# Patient Record
Sex: MALE | Race: BLACK OR AFRICAN AMERICAN | NOT HISPANIC OR LATINO | Employment: FULL TIME | URBAN - METROPOLITAN AREA
[De-identification: names, ages, dates, MRNs, and addresses within clinical notes are randomized per-mention and may not be internally consistent; named-entity substitution may affect disease eponyms.]

---

## 2018-04-15 ENCOUNTER — HOSPITAL ENCOUNTER (EMERGENCY)
Facility: HOSPITAL | Age: 28
Discharge: HOME | End: 2018-04-16
Attending: EMERGENCY MEDICINE | Admitting: EMERGENCY MEDICINE
Payer: COMMERCIAL

## 2018-04-15 DIAGNOSIS — J36 PERITONSILLAR ABSCESS: Primary | ICD-10-CM

## 2018-04-15 LAB
ANION GAP SERPL CALC-SCNC: 11 MMOL/L
BASOPHILS # BLD AUTO: 0.02 K/UL
BASOPHILS NFR BLD: 0.1 %
BUN SERPL-MCNC: 14 MG/DL
CALCIUM SERPL-MCNC: 9.3 MG/DL
CHLORIDE SERPL-SCNC: 104 MMOL/L
CO2 SERPL-SCNC: 27 MMOL/L
CREAT SERPL-MCNC: 1 MG/DL
DEPRECATED S PYO AG THROAT QL EIA: NEGATIVE
DIFFERENTIAL METHOD: ABNORMAL
EOSINOPHIL # BLD AUTO: 0.1 K/UL
EOSINOPHIL NFR BLD: 0.4 %
ERYTHROCYTE [DISTWIDTH] IN BLOOD BY AUTOMATED COUNT: 13.8 %
EST. GFR  (AFRICAN AMERICAN): >60 ML/MIN/1.73 M^2
EST. GFR  (NON AFRICAN AMERICAN): >60 ML/MIN/1.73 M^2
GLUCOSE SERPL-MCNC: 95 MG/DL
HCT VFR BLD AUTO: 41.9 %
HGB BLD-MCNC: 14.2 G/DL
LYMPHOCYTES # BLD AUTO: 1.7 K/UL
LYMPHOCYTES NFR BLD: 11.3 %
MCH RBC QN AUTO: 30.5 PG
MCHC RBC AUTO-ENTMCNC: 33.9 G/DL
MCV RBC AUTO: 90 FL
MONOCYTES # BLD AUTO: 1.1 K/UL
MONOCYTES NFR BLD: 7.1 %
NEUTROPHILS # BLD AUTO: 12.2 K/UL
NEUTROPHILS NFR BLD: 80.9 %
PLATELET # BLD AUTO: 192 K/UL
PMV BLD AUTO: 10.7 FL
POTASSIUM SERPL-SCNC: 3.5 MMOL/L
RBC # BLD AUTO: 4.65 M/UL
SODIUM SERPL-SCNC: 142 MMOL/L
WBC # BLD AUTO: 15.11 K/UL

## 2018-04-15 PROCEDURE — 25000003 PHARM REV CODE 250: Performed by: EMERGENCY MEDICINE

## 2018-04-15 PROCEDURE — 85025 COMPLETE CBC W/AUTO DIFF WBC: CPT

## 2018-04-15 PROCEDURE — 80048 BASIC METABOLIC PNL TOTAL CA: CPT

## 2018-04-15 PROCEDURE — 87081 CULTURE SCREEN ONLY: CPT

## 2018-04-15 PROCEDURE — 87880 STREP A ASSAY W/OPTIC: CPT

## 2018-04-15 PROCEDURE — 87147 CULTURE TYPE IMMUNOLOGIC: CPT

## 2018-04-15 PROCEDURE — 63600175 PHARM REV CODE 636 W HCPCS: Performed by: EMERGENCY MEDICINE

## 2018-04-15 RX ORDER — KETOROLAC TROMETHAMINE 30 MG/ML
15 INJECTION, SOLUTION INTRAMUSCULAR; INTRAVENOUS
Status: COMPLETED | OUTPATIENT
Start: 2018-04-15 | End: 2018-04-15

## 2018-04-15 RX ORDER — PENICILLIN V POTASSIUM 500 MG/1
500 TABLET, FILM COATED ORAL
COMMUNITY

## 2018-04-15 RX ORDER — CETIRIZINE HYDROCHLORIDE 10 MG/1
10 TABLET ORAL DAILY
COMMUNITY

## 2018-04-15 RX ORDER — PREDNISONE 20 MG/1
40 TABLET ORAL DAILY
COMMUNITY

## 2018-04-15 RX ORDER — LIDOCAINE HYDROCHLORIDE 20 MG/ML
SOLUTION OROPHARYNGEAL
Qty: 3 ML | Refills: 0 | Status: SHIPPED | OUTPATIENT
Start: 2018-04-15

## 2018-04-15 RX ORDER — LIDOCAINE HYDROCHLORIDE 20 MG/ML
5 SOLUTION OROPHARYNGEAL ONCE
Status: COMPLETED | OUTPATIENT
Start: 2018-04-15 | End: 2018-04-15

## 2018-04-15 RX ORDER — KETOROLAC TROMETHAMINE 10 MG/1
10 TABLET, FILM COATED ORAL EVERY 6 HOURS
Qty: 18 TABLET | Refills: 0 | Status: SHIPPED | OUTPATIENT
Start: 2018-04-15

## 2018-04-15 RX ORDER — DIPHENHYDRAMINE HYDROCHLORIDE 50 MG/ML
50 INJECTION INTRAMUSCULAR; INTRAVENOUS
Status: COMPLETED | OUTPATIENT
Start: 2018-04-15 | End: 2018-04-15

## 2018-04-15 RX ADMIN — KETOROLAC TROMETHAMINE 15 MG: 30 INJECTION, SOLUTION INTRAMUSCULAR at 09:04

## 2018-04-15 RX ADMIN — LIDOCAINE HYDROCHLORIDE 5 ML: 20 SOLUTION ORAL; TOPICAL at 09:04

## 2018-04-15 RX ADMIN — DIPHENHYDRAMINE HYDROCHLORIDE 50 MG: 50 INJECTION, SOLUTION INTRAMUSCULAR; INTRAVENOUS at 09:04

## 2018-04-16 VITALS
HEART RATE: 64 BPM | RESPIRATION RATE: 18 BRPM | OXYGEN SATURATION: 96 % | TEMPERATURE: 98 F | WEIGHT: 190 LBS | BODY MASS INDEX: 25.18 KG/M2 | HEIGHT: 73 IN | DIASTOLIC BLOOD PRESSURE: 58 MMHG | SYSTOLIC BLOOD PRESSURE: 130 MMHG

## 2018-04-16 PROCEDURE — 99284 EMERGENCY DEPT VISIT MOD MDM: CPT | Mod: ,,, | Performed by: EMERGENCY MEDICINE

## 2018-04-16 PROCEDURE — 25000003 PHARM REV CODE 250: Performed by: EMERGENCY MEDICINE

## 2018-04-16 PROCEDURE — 99285 EMERGENCY DEPT VISIT HI MDM: CPT

## 2018-04-16 RX ORDER — ACETAMINOPHEN 325 MG/1
650 TABLET ORAL
Status: COMPLETED | OUTPATIENT
Start: 2018-04-16 | End: 2018-04-16

## 2018-04-16 RX ORDER — CLINDAMYCIN HYDROCHLORIDE 150 MG/1
300 CAPSULE ORAL 3 TIMES DAILY
Qty: 60 CAPSULE | Refills: 0 | Status: SHIPPED | OUTPATIENT
Start: 2018-04-16 | End: 2018-04-26

## 2018-04-16 RX ORDER — METHYLPREDNISOLONE 4 MG/1
TABLET ORAL
Qty: 1 PACKAGE | Refills: 0 | Status: SHIPPED | OUTPATIENT
Start: 2018-04-16 | End: 2018-05-07

## 2018-04-16 RX ADMIN — ACETAMINOPHEN 650 MG: 325 TABLET ORAL at 06:04

## 2018-04-16 NOTE — ED NOTES
"Pt states "it is getting harder for me to swallow. My throat hurts worse in the last hour." Denies airway complications/obstruction/changes in breathing. Will continue to monitor.  "

## 2018-04-16 NOTE — ED PROVIDER NOTES
Encounter Date: 4/15/2018    SCRIBE #1 NOTE: I, Priscilla Alonso, am scribing for, and in the presence of, Dr. Jones.       History     Chief Complaint   Patient presents with    Sore Throat     Pt states he has had a sore throat since friday, and is concerned he has an abscess, and needs a scan.      Time seen by provider: 8:30 PM    This is a 27 y.o. male who presents with complaint of sore throat that began two days ago. Patient reports sore throat has gradually worsened since onset. He reports associated throat tightness and painful swallowing. He denies fever, chills, congestion, cough, nausea, or vomiting. He reports being seen at Urgent Care for the same complaint two days ago and was thought to have an allergic reaction. He was prescribed zyrtec and prednisone taper. At that time, patient had a negative rapid strep, and a positive throat culture for strep C.       The history is provided by the patient.     Review of patient's allergies indicates:   Allergen Reactions    Shellfish containing products Anaphylaxis    Bactroban [mupirocin calcium] Rash     History reviewed. No pertinent past medical history.  History reviewed. No pertinent surgical history.  History reviewed. No pertinent family history.  Social History   Substance Use Topics    Smoking status: Never Smoker    Smokeless tobacco: Not on file    Alcohol use Yes      Comment: social     Review of Systems   Constitutional: Negative for chills and fever.   HENT: Positive for sore throat. Negative for congestion and ear pain.         Positive for throat tightness. Positive for painful swallowing.    Respiratory: Negative for cough and shortness of breath.    Cardiovascular: Negative for chest pain.   Gastrointestinal: Negative for nausea and vomiting.   Genitourinary: Negative for dysuria.   Musculoskeletal: Negative for back pain.   Skin: Negative for rash.   Neurological: Negative for weakness.   Hematological: Does not bruise/bleed easily.        Physical Exam     Initial Vitals [04/15/18 1947]   BP Pulse Resp Temp SpO2   134/77 82 18 98.4 °F (36.9 °C) 99 %      MAP       96         Physical Exam    Nursing note and vitals reviewed.  Constitutional: He appears well-developed and well-nourished.  Non-toxic appearance. No distress.   HENT:   Head: Normocephalic and atraumatic.   Nose: Nose normal.   Hoarse voice. Tolerating secretions. No uvular edema Bilateral tonsillar edema. No tonsillar exudates.   No sublingual edema   Eyes: Conjunctivae and EOM are normal. Pupils are equal, round, and reactive to light.   Neck: Normal range of motion. Neck supple.   No meningismus.    Cardiovascular: Normal rate and normal heart sounds.   Pulmonary/Chest: Effort normal and breath sounds normal.   Abdominal: Soft. Normal appearance and bowel sounds are normal. There is no tenderness.   Musculoskeletal: Normal range of motion.   Neurological: He is alert and oriented to person, place, and time. He has normal strength. No cranial nerve deficit or sensory deficit.   Skin: Skin is warm and dry.   Psychiatric: He has a normal mood and affect. His behavior is normal. Thought content normal.         ED Course   Procedures  Labs Reviewed   CBC W/ AUTO DIFFERENTIAL - Abnormal; Notable for the following:        Result Value    WBC 15.11 (*)     Gran # (ANC) 12.2 (*)     Mono # 1.1 (*)     Gran% 80.9 (*)     Lymph% 11.3 (*)     All other components within normal limits   THROAT SCREEN, RAPID   CULTURE, STREP A,  THROAT   BASIC METABOLIC PANEL     Imaging Results          CT Soft Tissue Neck WO Contrast (Final result)  Result time 04/15/18 22:21:58    Final result by Christin Durand MD (04/15/18 22:21:58)                 Impression:      Asymmetric left tonsillar swelling with mild fluid or edema extending throughout the left parapharyngeal region and extending posteriorly to involve the upper retropharyngeal region on the left.  Findings may be seen in setting of  tonsillitis.  Evaluation of potential peritonsillar abscess is limited without the use of IV contrast.      Electronically signed by: Christin Durand MD  Date:    04/15/2018  Time:    22:21             Narrative:    EXAMINATION:  CT SOFT TISSUE NECK WITHOUT CONTRAST    CLINICAL HISTORY:  Sore throat/stridor, epiglottis or tonsillitis suspected;    TECHNIQUE:  Low dose axial images, sagittal and coronal reformations were performed from the skull base to the level of the clavicles.  Contrast was not administered.    COMPARISON:  None    FINDINGS:  There is asymmetric swelling seen within the left tonsillar region.  There is mild surrounding hypodensity suggestive for mild fluid, edema, or phlegmon which extends throughout the left parapharyngeal region and extending posteriorly to the upper retropharyngeal region on the left.  Evaluation for potential peritonsillar abscess is limited without the use of intravenous contrast.  Epiglottis is normal in thickness.  Airway is patent.  Aryepiglottic folds are normal in appearance.    Visualized lung apices are clear.  No acute osseous abnormality identified.  Mucous retention cyst or polyp is noted within the left maxillary antrum.  Remaining visualized paranasal sinuses and mastoid air cells are otherwise clear.  Visualized portion of the brain shows no significant abnormalities.  Parotid glands and submandibular glands are normal and symmetric in size.  Thyroid gland is unremarkable.  Prominent left cervical chain lymph nodes are seen.                                        Medical Decision Making:   Initial Assessment:   Urgent evaluation of 27-year-old gentleman presenting with a sore throat progressing for the last 2 days.  Patient was seen at urgent care, with negative strep screen, however called regarding strep C positive on culture, and discharged with Pen-vk and prednisone.  Patient endorses acute worsening with hoarse voice.  On exam patient is nontoxic-appearing,  no stridor, tolerating secretions, bilateral tonsillar edema present, without obvious exudates.  Suspect pharyngitis though given progression, we will obtain imaging to rule out deep space infection.  Clinical Tests:   Lab Tests: Ordered and Reviewed  Radiological Study: Ordered and Reviewed  ED Management:  Discussed pt's care with a primary provider of his on the phone, who was adamant about pt not receiving IV contrast given fish allergy, and recommending MRI or US for further imaging. Given my clinical exam, pt has patent airway without stridor. Discussed limited utility of CT without contrast, but preferred by pt. Endorsed to Dr Rizzo for follow up and dispo pending results.               Attending Attestation:           Physician Attestation for Scribe:  Physician Attestation Statement for Scribe #1: I, Dr. Jones, reviewed documentation, as scribed by Priscilla Alonso in my presence, and it is both accurate and complete.                    Clinical Impression:     1. Peritonsillar abscess                               Phuong Jones MD  04/17/18 2799

## 2018-04-16 NOTE — ED PROVIDER NOTES
Encounter Date: 4/15/2018    SCRIBE #1 NOTE: I, Vikki Gutiérrez, am scribing for, and in the presence of,  Dr. Meyer. I have scribed the entire note.       History     Chief Complaint   Patient presents with    Sore Throat     Pt states he has had a sore throat since friday, and is concerned he has an abscess, and needs a scan.      27 year old male transferred here for possible pta. He reports pain to left side of his throat and pain with swallowing, but denies any trouble swallowing, fever, and he has no other complaints.        The history is provided by the patient and medical records.     Review of patient's allergies indicates:   Allergen Reactions    Shellfish containing products Anaphylaxis    Bactroban [mupirocin calcium] Rash     History reviewed. No pertinent past medical history.  History reviewed. No pertinent surgical history.  History reviewed. No pertinent family history.  Social History   Substance Use Topics    Smoking status: Never Smoker    Smokeless tobacco: Not on file    Alcohol use Yes      Comment: social     Review of Systems   Constitutional: Negative for fever.   HENT: Negative for sore throat.    Respiratory: Negative for shortness of breath.    Cardiovascular: Negative for chest pain.   Gastrointestinal: Negative for abdominal pain.   Genitourinary: Negative for dysuria.   Musculoskeletal: Negative for back pain.   Skin: Negative for rash.   Neurological: Negative for weakness.   Psychiatric/Behavioral: Negative for confusion.       Physical Exam     Initial Vitals [04/15/18 1947]   BP Pulse Resp Temp SpO2   134/77 82 18 98.4 °F (36.9 °C) 99 %      MAP       96         Physical Exam    Nursing note and vitals reviewed.  Constitutional: He appears well-nourished.   HENT:   Head: Normocephalic and atraumatic.   swollen erythematous left tonsil   Eyes: EOM are normal. Pupils are equal, round, and reactive to light.   Neck: Normal range of motion. Neck supple.    tender anterior  "cervical adenopathy.    Cardiovascular: Normal rate, regular rhythm, normal heart sounds and intact distal pulses.   Pulmonary/Chest: Breath sounds normal. No respiratory distress.   Abdominal: Soft. Bowel sounds are normal. He exhibits no distension. There is no tenderness.   Musculoskeletal: Normal range of motion.   Neurological: He is alert and oriented to person, place, and time. He has normal strength.   Skin: Skin is warm and dry. Capillary refill takes less than 2 seconds.   Psychiatric: He has a normal mood and affect. Thought content normal.         ED Course   Procedures  Labs Reviewed   CBC W/ AUTO DIFFERENTIAL - Abnormal; Notable for the following:        Result Value    WBC 15.11 (*)     Gran # (ANC) 12.2 (*)     Mono # 1.1 (*)     Gran% 80.9 (*)     Lymph% 11.3 (*)     All other components within normal limits   THROAT SCREEN, RAPID   CULTURE, STREP A,  THROAT   BASIC METABOLIC PANEL             Medical Decision Making:   History:   Old Medical Records: I decided to obtain old medical records.  Initial Assessment:   27 year old male transferred here for possible pta. He reports pain to left side of his throat and pain with swallowing, but denies any trouble swallowing, fever, and he has no other complaints.  Differential Diagnosis:   [facility] but refused due to questionable passive agrerrwsive quote "shellfish allergy"  Clinical Tests:   Lab Tests: Ordered and Reviewed  Radiological Study: Ordered and Reviewed  ED Management:  ENT consulted. On arrival at approximately 4 am ENT arrived for evaluation, they offered I&D which patient refused. Will start steroids and clindamycin according to ENT recommendations. Able to tolerate secretions without difficulty. stable for discharge home. Advised pt to follow up with PCP or return if concerning symptoms arise. Pt understands and agrees with plan. Will d/c home.  Other:   I have discussed this case with " another health care provider.       <> Summary of the Discussion: ENT            Scribe Attestation:   Scribe #1: I performed the above scribed service and the documentation accurately describes the services I performed. I attest to the accuracy of the note.               Clinical Impression:   There were no encounter diagnoses.    Disposition:   Disposition: Discharged  Condition: Stable                        Gigi Meyer MD  04/16/18 0539

## 2018-04-16 NOTE — PROGRESS NOTES
11:58 PM  I assumed care of this patient at 10 pm from Dr. Jones.  At that time the patient was awaiting the results of his CT.  CT cannot exclude a peritonsillar abscess given lack of contrast.  I explained this to the patient and his PCP in [place].  I do feel the patient is appropriate for f/u in clinic for further evaluation by ENT, but [facility]  The case has been d/w Dr. Saeed and the patient will be transferred to [facility] for ENT evaluation.

## 2018-04-16 NOTE — ED NOTES
Pt complains of a sore throat since Friday. Was seen at urgent care and given prednisone and zyrtec, rapid strep was negative but today was called and told culture was positive for strep c and was given penicillin which he took 1 dose. He states it hurts to swallow and to talk. Tonsils are large and red throat is red. No exudate noted.

## 2018-04-16 NOTE — ED NOTES
At bedside with Dr Rizzo to discuss plan of care, pt calls PCP to consult over the phone. Pt is explained the options of aspiration by ER physician here, [facility] versus clinic follow up. Awaiting pt decision.

## 2018-04-16 NOTE — ED NOTES
Pt is resting in bed, sitting up watching tv with significant other at bedside. Pt holding own airway with no difficulty. Will continue to monitor.

## 2018-04-16 NOTE — CONSULTS
Otolaryngology - Head and Neck Surgery  Consultation Report    Consultation From: ED    Chief Complaint: Evaluate for PTA    History of Present Illness: 27 y.o. year old male presents sore throat for the past two days that has progressively worsened. He complains of sore throat and pain with swallowing. He denies any respiratory difficulty/noisy breathing, denies shortness of breath, denies trismus/difficulty with mouth opening, denies difficulty tolerating secretions.      Review of Systems   Constitutional: Negative for fever.   HENT: sore throat  Respiratory: Negative for shortness of breath.    Cardiovascular: Negative for chest pain.   Gastrointestinal: Negative for abdominal pain.   Genitourinary: Negative for dysuria.   Musculoskeletal: Negative for back pain.   Skin: Negative for rash.   Neurological: Negative for weakness.   Psychiatric/Behavioral: Negative for confusion.     Past Medical History: Patient has no past medical history on file.    Past Surgical History: Patient has no past surgical history on file.    Social History: Patient reports that he has never smoked. He does not have any smokeless tobacco history on file. He reports that he drinks alcohol. He reports that he does not use drugs.    Family History: family history is not on file.    Medications:   No current facility-administered medications on file prior to encounter.      No current outpatient prescriptions on file prior to encounter.         Allergies: Patient is allergic to shellfish containing products and bactroban [mupirocin calcium].    Physical Exam:  Temp:  [98.4 °F (36.9 °C)] 98.4 °F (36.9 °C)  Pulse:  [60-98] 76  Resp:  [18] 18  SpO2:  [95 %-99 %] 97 %  BP: (131-156)/(60-84) 150/66        Constitutional: Well appearing / communicating.  NAD.  Eyes: EOM I Bilaterally  Head/Face: Normocephalic.  Negative paranasal sinus pressure/tenderness.  Salivary glands WNL.  House Brackmann I Bilaterally.  Right Ear: External Auditory Canal  WNL, Auricle WNL.  Left Ear: External Auditory Canal WNL, Auricle WNL.  Nose: No gross nasal septal deviation. Inferior Turbinates 2+ bilaterally. No septal perforation. No masses/lesions. External nasal skin without masses/lesions.  Oral Cavity: Gingiva/lips WNL.  FOM Soft, no masses palpated. Oral Tongue mobile. Hard Palate WNL.   Oropharynx: left tonsil 3+ with mild edema of soft palate. Midline uvula, no exudate. Right tonsil 2+.BOT WNL. No masses/lesions noted. Posterior oropharynx WNL.  Soft palate without masses.   Neck/Lymphatic: No LAD I-VI bilaterally.  No thyromegaly.  No masses noted on exam.  Neuro/Psychiatric: AOx3.  Normal mood and affect.   Cardiovascular: Normal carotid pulses bilaterally, no increasing jugular venous distention noted at cervical region bilaterally.    Respiratory: Normal respiratory effort, no stridor, no retractions noted.        CBC    Recent Labs  Lab 04/15/18  2053   WBC 15.11*   HGB 14.2   HCT 41.9   MCV 90        BMP    Recent Labs  Lab 04/15/18  2053   GLU 95      K 3.5      CO2 27   BUN 14   CREATININE 1.0   CALCIUM 9.3       Imaging Results          CT Soft Tissue Neck WO Contrast (Final result)  Result time 04/15/18 22:21:58    Final result by Christin Durand MD (04/15/18 22:21:58)                 Impression:      Asymmetric left tonsillar swelling with mild fluid or edema extending throughout the left parapharyngeal region and extending posteriorly to involve the upper retropharyngeal region on the left.  Findings may be seen in setting of tonsillitis.  Evaluation of potential peritonsillar abscess is limited without the use of IV contrast.      Electronically signed by: Christin Durand MD  Date:    04/15/2018  Time:    22:21             Narrative:    EXAMINATION:  CT SOFT TISSUE NECK WITHOUT CONTRAST    CLINICAL HISTORY:  Sore throat/stridor, epiglottis or tonsillitis suspected;    TECHNIQUE:  Low dose axial images, sagittal and coronal reformations  were performed from the skull base to the level of the clavicles.  Contrast was not administered.    COMPARISON:  None    FINDINGS:  There is asymmetric swelling seen within the left tonsillar region.  There is mild surrounding hypodensity suggestive for mild fluid, edema, or phlegmon which extends throughout the left parapharyngeal region and extending posteriorly to the upper retropharyngeal region on the left.  Evaluation for potential peritonsillar abscess is limited without the use of intravenous contrast.  Epiglottis is normal in thickness.  Airway is patent.  Aryepiglottic folds are normal in appearance.    Visualized lung apices are clear.  No acute osseous abnormality identified.  Mucous retention cyst or polyp is noted within the left maxillary antrum.  Remaining visualized paranasal sinuses and mastoid air cells are otherwise clear.  Visualized portion of the brain shows no significant abnormalities.  Parotid glands and submandibular glands are normal and symmetric in size.  Thyroid gland is unremarkable.  Prominent left cervical chain lymph nodes are seen.                                 Assessment: 28 y/o M with possible left side PTA, no contrast on CT scan to definitely confirm. Offered incision and drainage although patient declined. He would like to be sent home on antibiotics and steroids.I explained the risks of refusing treatment for possible PTA including worsening infection/sepsis/airway compromise, patient acknowledges. His sole complaints to me are sore throat and mild pain with swallowing, he denies any respiratory difficulty.     Plan:   -clindamycin 300mg TID x10- days  -medrol dose gustavo  -return to ED immediately if worsening symptoms  -d/w staff Dr. Saeed

## 2018-04-16 NOTE — ED NOTES
Pt very concerned about getting contrast for the CT, pt on the phone with his PCP and asks that she please speak with the doctor. MD aware and comes to bedside to speak with PCP on the phone.

## 2018-04-18 LAB — BACTERIA THROAT CULT: NORMAL
